# Patient Record
Sex: FEMALE | Race: WHITE | NOT HISPANIC OR LATINO
[De-identification: names, ages, dates, MRNs, and addresses within clinical notes are randomized per-mention and may not be internally consistent; named-entity substitution may affect disease eponyms.]

---

## 2020-01-13 ENCOUNTER — TRANSCRIPTION ENCOUNTER (OUTPATIENT)
Age: 50
End: 2020-01-13

## 2020-03-09 ENCOUNTER — TRANSCRIPTION ENCOUNTER (OUTPATIENT)
Age: 50
End: 2020-03-09

## 2020-05-26 ENCOUNTER — TRANSCRIPTION ENCOUNTER (OUTPATIENT)
Age: 50
End: 2020-05-26

## 2021-05-05 ENCOUNTER — APPOINTMENT (OUTPATIENT)
Dept: DISASTER EMERGENCY | Facility: OTHER | Age: 51
End: 2021-05-05

## 2022-03-16 ENCOUNTER — NON-APPOINTMENT (OUTPATIENT)
Age: 52
End: 2022-03-16

## 2022-03-16 ENCOUNTER — APPOINTMENT (OUTPATIENT)
Dept: ORTHOPEDIC SURGERY | Facility: CLINIC | Age: 52
End: 2022-03-16
Payer: COMMERCIAL

## 2022-03-16 DIAGNOSIS — M54.50 LOW BACK PAIN, UNSPECIFIED: ICD-10-CM

## 2022-03-16 PROCEDURE — 72100 X-RAY EXAM L-S SPINE 2/3 VWS: CPT

## 2022-03-16 PROCEDURE — 99204 OFFICE O/P NEW MOD 45 MIN: CPT

## 2023-03-02 ENCOUNTER — APPOINTMENT (OUTPATIENT)
Dept: ORTHOPEDIC SURGERY | Facility: CLINIC | Age: 53
End: 2023-03-02
Payer: COMMERCIAL

## 2023-03-02 VITALS — WEIGHT: 158 LBS | HEIGHT: 65 IN | BODY MASS INDEX: 26.33 KG/M2

## 2023-03-02 DIAGNOSIS — M51.36 OTHER INTERVERTEBRAL DISC DEGENERATION, LUMBAR REGION: ICD-10-CM

## 2023-03-02 PROCEDURE — 99214 OFFICE O/P EST MOD 30 MIN: CPT

## 2023-03-02 RX ORDER — TIZANIDINE 4 MG/1
4 TABLET ORAL
Qty: 60 | Refills: 1 | Status: ACTIVE | COMMUNITY
Start: 2023-03-02 | End: 1900-01-01

## 2023-03-02 NOTE — PHYSICAL EXAM
[Normal] : Gait: normal [Carrera's Sign] : negative Carrera's sign [Pronator Drift] : negative pronator drift [SLR] : negative straight leg raise [de-identified] : 5 out of 5 motor strength, sensation is intact and symmetrical full range of motion flexion extension and rotation, no palpatory tenderness full range of motion of hips knees shoulders and elbows (all four extremities), no atrophy, negative straight leg raise, no pathological reflexes, no swelling, normal ambulation, no apparent distress skin is intact, no palpable lymph nodes, no upper or lower extremity instability, alert and oriented x3 and normal mood. Normal finger-to nose test.  \par Adequate ROM\par  [de-identified] : XR AP Lat Lumbar [id number] with patient.

## 2023-03-02 NOTE — HISTORY OF PRESENT ILLNESS
[Improving] : improving [de-identified] : 51 year female presents for evaluation of chronic lower back pain. \uday Last seen March 2022 and was referred to neurology and PT at the time. Did not go for PT.\par She presents for right and left sided lower back pain, states that right has improved but still with left lower back pain.\uday Denies radiation of pain down the legs. \uday Has numbness at the left posterior thigh at times. \par Laying on her sides and standing aggravates the pain.\uday Takes advil and uses ice, has more relief with the ice. \uday Has not tried PT or chiropractic care. \uday Has been doing stretching exercises every day for the past 3 weeks which alleviates the pain temporary relief. \uday Also experiences locking sensation to the back.\uday Denies injections. \par No fever chills sweats nausea vomiting no bowel or bladder dysfunction, no recent weight loss or gain no night pain. This history is in addition to the intake form that I personally reviewed.

## 2023-03-02 NOTE — DISCUSSION/SUMMARY
[de-identified] : Mild lumbar degenerative disc disease. \par Lumbago.\par Discussed all options. \par Tizanidine PRN.\par Lumbar MRI referral.\par F/U after MRI.\par All options discussed including rest, medicine, home exercise, acupuncture, Chiropractic care, Physical Therapy, Pain management, and last resort surgery. All questions were answered, all alternatives discussed and the patient is in complete agreement with that plan. Follow-up appointment as instructed. Any issues and the patient will call or come in sooner. \par Patient is in agreement with plan. \par  Yes-Patient/Caregiver accepts free interpretation services...

## 2023-03-02 NOTE — ADDENDUM
[FreeTextEntry1] : This note was written by Roc Coronado on 03/02/2023 acting as scribe for Dr. Luis Alberto Muller M.D.\par \par I, Luis Alberto Muller MD, have read and attest that all the information, medical decision making and discharge instructions within are true and accurate.

## 2023-03-17 ENCOUNTER — NON-APPOINTMENT (OUTPATIENT)
Age: 53
End: 2023-03-17

## 2023-03-24 ENCOUNTER — OUTPATIENT (OUTPATIENT)
Dept: OUTPATIENT SERVICES | Facility: HOSPITAL | Age: 53
LOS: 1 days | End: 2023-03-24
Payer: COMMERCIAL

## 2023-03-24 ENCOUNTER — APPOINTMENT (OUTPATIENT)
Dept: MRI IMAGING | Facility: IMAGING CENTER | Age: 53
End: 2023-03-24
Payer: COMMERCIAL

## 2023-03-24 DIAGNOSIS — M54.50 LOW BACK PAIN, UNSPECIFIED: ICD-10-CM

## 2023-03-24 DIAGNOSIS — M51.36 OTHER INTERVERTEBRAL DISC DEGENERATION, LUMBAR REGION: ICD-10-CM

## 2023-03-24 PROCEDURE — 72148 MRI LUMBAR SPINE W/O DYE: CPT

## 2023-03-24 PROCEDURE — 72148 MRI LUMBAR SPINE W/O DYE: CPT | Mod: 26

## 2023-03-30 RX ORDER — CYCLOBENZAPRINE HYDROCHLORIDE 5 MG/1
5 TABLET, FILM COATED ORAL 3 TIMES DAILY
Qty: 30 | Refills: 0 | Status: ACTIVE | COMMUNITY
Start: 2023-03-30 | End: 1900-01-01

## 2023-10-19 ENCOUNTER — OFFICE (OUTPATIENT)
Dept: URBAN - METROPOLITAN AREA CLINIC 27 | Facility: CLINIC | Age: 53
Setting detail: OPHTHALMOLOGY
End: 2023-10-19
Payer: COMMERCIAL

## 2023-10-19 DIAGNOSIS — H25.13: ICD-10-CM

## 2023-10-19 DIAGNOSIS — H40.013: ICD-10-CM

## 2023-10-19 DIAGNOSIS — H11.153: ICD-10-CM

## 2023-10-19 DIAGNOSIS — H18.413: ICD-10-CM

## 2023-10-19 PROCEDURE — 92004 COMPRE OPH EXAM NEW PT 1/>: CPT | Performed by: OPHTHALMOLOGY

## 2023-10-19 PROCEDURE — 92133 CPTRZD OPH DX IMG PST SGM ON: CPT | Performed by: OPHTHALMOLOGY

## 2023-10-19 ASSESSMENT — TONOMETRY
OS_IOP_MMHG: 11
OD_IOP_MMHG: 14

## 2023-10-19 ASSESSMENT — SPHEQUIV_DERIVED
OS_SPHEQUIV: 0
OD_SPHEQUIV: -0.125

## 2023-10-19 ASSESSMENT — REFRACTION_AUTOREFRACTION
OS_AXIS: 10
OS_CYLINDER: +1.00
OD_AXIS: 151
OD_CYLINDER: +0.75
OS_SPHERE: -0.50
OD_SPHERE: -0.50

## 2023-10-19 ASSESSMENT — KERATOMETRY
METHOD_AUTO_MANUAL: AUTO
OS_AXISANGLE_DEGREES: 90
OD_K1POWER_DIOPTERS: 41.50
OD_AXISANGLE_DEGREES: 109
OD_K2POWER_DIOPTERS: 42.25
OS_K1POWER_DIOPTERS: 41.25
OS_K2POWER_DIOPTERS: 41.25

## 2023-10-19 ASSESSMENT — AXIALLENGTH_DERIVED
OD_AL: 24.2556
OS_AL: 24.4484

## 2023-10-19 ASSESSMENT — VISUAL ACUITY
OS_BCVA: 20/25-1
OD_BCVA: 20/25-2

## 2023-10-19 ASSESSMENT — CONFRONTATIONAL VISUAL FIELD TEST (CVF)
OD_FINDINGS: FULL
OS_FINDINGS: FULL

## 2024-10-07 ENCOUNTER — APPOINTMENT (OUTPATIENT)
Dept: PSYCHIATRY | Facility: CLINIC | Age: 54
End: 2024-10-07
Payer: COMMERCIAL

## 2024-10-07 PROCEDURE — 90791 PSYCH DIAGNOSTIC EVALUATION: CPT | Mod: 95
